# Patient Record
Sex: FEMALE | Race: WHITE | NOT HISPANIC OR LATINO | Employment: FULL TIME | URBAN - NONMETROPOLITAN AREA
[De-identification: names, ages, dates, MRNs, and addresses within clinical notes are randomized per-mention and may not be internally consistent; named-entity substitution may affect disease eponyms.]

---

## 2018-08-27 ENCOUNTER — TRANSCRIBE ORDERS (OUTPATIENT)
Dept: ADMINISTRATIVE | Facility: HOSPITAL | Age: 53
End: 2018-08-27

## 2018-08-27 DIAGNOSIS — Z01.810 PRE-OPERATIVE CARDIOVASCULAR EXAMINATION: Primary | ICD-10-CM

## 2019-05-21 ENCOUNTER — OFFICE VISIT (OUTPATIENT)
Dept: FAMILY MEDICINE CLINIC | Facility: CLINIC | Age: 54
End: 2019-05-21

## 2019-05-21 VITALS
BODY MASS INDEX: 28.04 KG/M2 | OXYGEN SATURATION: 98 % | SYSTOLIC BLOOD PRESSURE: 128 MMHG | WEIGHT: 185 LBS | DIASTOLIC BLOOD PRESSURE: 80 MMHG | HEIGHT: 68 IN
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
## 2019-05-21 DIAGNOSIS — M25.531 RIGHT WRIST PAIN: Primary | ICD-10-CM

## 2019-05-21 PROCEDURE — 99213 OFFICE O/P EST LOW 20 MIN: CPT | Performed by: FAMILY MEDICINE

## 2019-05-21 RX ORDER — MULTIVIT WITH MINERALS/LUTEIN
1000 TABLET ORAL DAILY
COMMUNITY
End: 2020-04-20

## 2019-05-28 NOTE — PROGRESS NOTES
ID: Swetha Mabry    CC:   Chief Complaint   Patient presents with   • Wrist Injury   • Fall       Subjective:     HPI     Swetha Mabry is a 54 y.o. female who presents with complaint of right wrist pain.  Patient states that she fell yesterday when she tripped at her home.  Since then she has had pain of the right wrist and hand.  She also endorses trace swelling and bruising with a minor abrasion present.  Pain is described as a dull throb which is worsened with use of the hand.  She does feel that this is improving.        Past Medical Hx:  No past medical history on file.    Past Surgical Hx:  No past surgical history on file.    Health Maintenance:  Health Maintenance   Topic Date Due   • ANNUAL PHYSICAL  01/14/1970   • TDAP/TD VACCINES (1 - Tdap) 01/14/1986   • ZOSTER VACCINE (1 of 2) 01/14/2017   • MAMMOGRAM  08/27/2018   • COLONOSCOPY  08/27/2018   • INFLUENZA VACCINE  08/01/2019   • PAP SMEAR  03/06/2021       Current Meds:    Current Outpatient Medications:   •  Cholecalciferol (VITAMIN D-3) 5000 units tablet, Take  by mouth., Disp: , Rfl:   •  vitamin E 1000 UNIT capsule, Take 1,000 Units by mouth Daily., Disp: , Rfl:     Allergies:  Meperidine and Latex    Family Hx:  No family history on file.     Social History:  Social History     Socioeconomic History   • Marital status:      Spouse name: Not on file   • Number of children: Not on file   • Years of education: Not on file   • Highest education level: Not on file       Review of Systems   Constitutional: Negative for activity change, appetite change, fatigue and fever.   HENT: Negative for ear pain and sore throat.    Eyes: Negative for pain and visual disturbance.   Respiratory: Negative for cough and shortness of breath.    Cardiovascular: Negative for chest pain and palpitations.   Gastrointestinal: Negative for abdominal pain and nausea.   Endocrine: Negative for cold intolerance and heat intolerance.   Genitourinary: Negative for  "difficulty urinating and dysuria.   Musculoskeletal: Negative for arthralgias and gait problem.   Skin: Positive for wound. Negative for color change and rash.   Neurological: Negative for dizziness, weakness and headaches.   Hematological: Negative for adenopathy. Does not bruise/bleed easily.   Psychiatric/Behavioral: Negative for agitation, confusion and sleep disturbance.           Objective:     /80   Ht 172.7 cm (68\")   Wt 83.9 kg (185 lb)   SpO2 98%   BMI 28.13 kg/m²     Physical Exam   Constitutional: She is oriented to person, place, and time. She appears well-developed and well-nourished. No distress.   HENT:   Head: Normocephalic and atraumatic.   Nose: Nose normal.   Eyes: Conjunctivae are normal. Right eye exhibits no discharge. Left eye exhibits no discharge.   Neck: Neck supple.   Cardiovascular: Normal rate, regular rhythm and normal heart sounds. Exam reveals no gallop and no friction rub.   No murmur heard.  Pulmonary/Chest: Effort normal and breath sounds normal. No accessory muscle usage. No respiratory distress. She has no wheezes. She has no rales. She exhibits no tenderness.   Abdominal: Soft. Bowel sounds are normal. She exhibits no distension. There is no tenderness.   Musculoskeletal: She exhibits no edema or deformity.        Hands:  Neurological: She is alert and oriented to person, place, and time.   Skin: Skin is warm and dry. No rash noted. She is not diaphoretic. No erythema. No pallor.   Psychiatric: She has a normal mood and affect. Her behavior is normal.       Assessment/Plan:     Swetha was seen today for wrist injury and fall.    Diagnoses and all orders for this visit:    Right wrist pain  -     XR Wrist 3+ View Right; Future    Fall, initial encounter  -     XR Wrist 3+ View Right; Future          Follow-up:     Return if symptoms worsen or fail to improve.    Patient's Body mass index is 28.13 kg/m². BMI is above normal parameters. Recommendations include: exercise " counseling and nutrition counseling.      Health Maintenance   Topic Date Due   • ANNUAL PHYSICAL  01/14/1970   • TDAP/TD VACCINES (1 - Tdap) 01/14/1986   • ZOSTER VACCINE (1 of 2) 01/14/2017   • MAMMOGRAM  08/27/2018   • COLONOSCOPY  08/27/2018   • INFLUENZA VACCINE  08/01/2019   • PAP SMEAR  03/06/2021     eat more fruits and vegetables, increase water intake, reduce screen time, cut out extra servings, family to eat at dinner table more often and have 3 meals a day    RISK SCORE: 2          This document has been electronically signed by Marika Flor MD on May 28, 2019 3:16 PM

## 2019-05-28 NOTE — PROGRESS NOTES
I have reviewed the notes, assessments, and/or procedures performed by dr Flor, I concur with her/his documentation and assessment and plan for Swetha Mabry.                This document has been electronically signed by Timmy Guzmán MD on May 28, 2019 5:37 PM

## 2019-08-19 ENCOUNTER — OFFICE VISIT (OUTPATIENT)
Dept: FAMILY MEDICINE CLINIC | Facility: CLINIC | Age: 54
End: 2019-08-19

## 2019-08-19 VITALS
WEIGHT: 180.6 LBS | TEMPERATURE: 97 F | HEART RATE: 77 BPM | BODY MASS INDEX: 27.37 KG/M2 | HEIGHT: 68 IN | SYSTOLIC BLOOD PRESSURE: 130 MMHG | DIASTOLIC BLOOD PRESSURE: 78 MMHG | OXYGEN SATURATION: 99 %

## 2019-08-19 DIAGNOSIS — H00.012 HORDEOLUM EXTERNUM OF RIGHT LOWER EYELID: Primary | ICD-10-CM

## 2019-08-19 PROCEDURE — 99213 OFFICE O/P EST LOW 20 MIN: CPT | Performed by: STUDENT IN AN ORGANIZED HEALTH CARE EDUCATION/TRAINING PROGRAM

## 2019-08-29 PROBLEM — N60.01 BREAST CYST, RIGHT: Status: ACTIVE | Noted: 2019-04-05

## 2019-08-29 PROBLEM — N93.8 DUB (DYSFUNCTIONAL UTERINE BLEEDING): Status: ACTIVE | Noted: 2018-09-07

## 2019-08-30 NOTE — PROGRESS NOTES
FAMILY MEDICINE  RESIDENCY CLINIC PROGRESS NOTE  Clifford Ramírez Jr, MD  Subjective   SUBJECTIVE:   CC: Stye (pt noticed last night. right ey)     Swetha Mabry is a 54 y.o. female who [facility] today for a Stye on her right eye.    Stye  Patient presents for evaluation of Stye in the right eye on the external aspect of the lower lid.  Associated symptoms include pain with palpation of the area & tearing. She has noticed the above symptoms for 1 day.  Onset was sudden & began last night. Patient denies visual changes, discharge, redness, or other symptoms.  She has not tried any remedies so far.       I have reviewed the patient's medical, family, and social history in detail;there are no changes to the history as noted in the electronic medical record.   Concurrent Medical History:  History reviewed. No pertinent past medical history.  No past surgical history on file.  Current Outpatient Medications on File Prior to Visit   Medication Sig   • Cholecalciferol (VITAMIN D-3) 5000 units tablet Take  by mouth.   • vitamin E 1000 UNIT capsule Take 1,000 Units by mouth Daily.     No current facility-administered medications on file prior to visit.      She is allergic to meperidine and latex.    History reviewed. No pertinent family history.     She  reports that she has never smoked. She has never used smokeless tobacco. She reports that she does not drink alcohol or use drugs.    Review of Systems General: negative for - fatigue or weight loss  PULM: no cough, shortness of breath, or wheezing  HEENT: stye on right eye lower lid  CV: no chest pain or dyspnea on exertion  MSK: Negative for gait disturbance, joint pain, joint swelling or muscular weakness  GI: no abdominal pain, change in bowel habits, or black or bloody stools  NEURO: no TIA or stroke symptoms; No confusion, dizziness, HA or seizures    Objective   OBJECTIVE:     Vitals:    08/19/19 0858   BP: 130/78   BP  "Location: Left arm   Patient Position: Sitting   Pulse: 77   Temp: 97 °F (36.1 °C)   TempSrc: Temporal   SpO2: 99%   Weight: 81.9 kg (180 lb 9.6 oz)   Height: 172.7 cm (68\")     Physical Exam GEN: Well developed, in no acute distress  HEENT: Nodule of external right lower eye lid visualized & palpated w/noted TTP  CV: Normal S1/S2, no murmurs or friction rubs  PULM: Normal effort, without wheezes or rhonchi  GI: Soft, non-tender & non-distended; +BSx4  Ext: Full ROM, without edema or deformities  Neuro: AxO x 3, normal gait  Psych: appropriate mood & affect    DATA REVIEWED:  No visits with results within 3 Month(s) from this visit.   Latest known visit with results is:   Conversion Encounter on 01/07/2015   Component Date Value Ref Range Status   • TSH 01/07/2015 0.50  0.46 - 4.68 uIU/ml Final   • WBC 01/07/2015 4.1  3.2 - 9.8 x1000/uL Final   • RBC 01/07/2015 4.68  3.77 - 5.16 scott/mm3 Final   • Hemoglobin 01/07/2015 14.1  12.0 - 15.5 gm/dl Final   • Hematocrit 01/07/2015 41.6  35.0 - 45.0 % Final   • MCV 01/07/2015 88.9  80.0 - 98.0 fl Final   • MCH 01/07/2015 30.1  26.0 - 34.0 pg Final   • MCHC 01/07/2015 33.9  31.4 - 36.0 gm/dl Final   • RDW 01/07/2015 11.9  11.5 - 14.5 % Final   • Platelets 01/07/2015 249  150 - 450 x1000/mm3 Final   • MPV 01/07/2015 10.0  8.0 - 12.0 fl Final   • Neutrophil Rel % 01/07/2015 48.6  37.0 - 80.0 % Final   • Lymphocyte Rel % 01/07/2015 30.4  10.0 - 50.0 % Final   • Monocyte Rel % 01/07/2015 13.9* 0.0 - 12.0 % Final   • Eosinophil Rel % 01/07/2015 6.6  0.0 - 7.0 % Final   • Basophil Rel % 01/07/2015 0.5  0.0 - 2.0 % Final   • Immature Granulocyte Rel % 01/07/2015 0.00  0.00 - 0.50 % Final   • Neutrophils Absolute 01/07/2015 2.00  2.00 - 8.60 x1000/uL Final   • Lymphocytes Absolute 01/07/2015 1.25  0.60 - 4.20 x1000/uL Final   • Monocytes Absolute 01/07/2015 0.57  0.00 - 0.90 x1000/uL Final   • Eosinophils Absolute 01/07/2015 0.27  0.00 - 0.70 x1000/uL Final   • Basophils Absolute " 01/07/2015 0.02  0.00 - 0.20 x1000/uL Final   • Immature Granulocytes Absolute 01/07/2015 0.000* 0.005 - 0.022 x1000/uL Final     Assessment/Plan   ASSESSMENT & PLAN:      Diagnosis Plan   1. Hordeolum externum of right lower eyelid  · Warm compresses x 4 daily  · OTC pain medication as needed  · RTC if symptoms worsen, or have not improved in 1 week       Medication Refills:  Requested Prescriptions      No prescriptions requested or ordered in this encounter       -- Preventive Medicine Topics --   Health Maintenance Topics with due status: Overdue       Topic Date Due    ANNUAL PHYSICAL 01/14/1970    TDAP/TD VACCINES 01/14/1986    ZOSTER VACCINE 01/14/2017    MAMMOGRAM 08/27/2018     Health Maintenance Topics with due status: Due On       Topic Date Due    INFLUENZA VACCINE 08/01/2019     Patient's Body mass index is 27.46 kg/m². BMI is above normal parameters. Recommendations include: educational material and nutrition counseling.    Ms. Mabry   reports that she has never smoked. She has never used smokeless tobacco.     Patient Instructions   Follow-up:   Return if symptoms worsen or fail to improve.    No future appointments.     RISK SCORE: 1   ___________________________  Clifford Ramírez Jr., M.D. PGY3  Family Practice Resident  [address] [place] [address]  Phone: [phone]  Fax: [phone]  ¯¯¯¯¯¯¯¯¯¯¯¯¯¯¯¯¯¯¯¯¯¯¯¯¯¯¯    This document has been electronically signed by  Clifford Ramírez Jr, MD on 08/19/2019 9:08 PM

## 2019-08-30 NOTE — PROGRESS NOTES
I have reviewed the notes, assessments, and/or procedures performed by Dr. Ramírez, I concur with her/his documentation and assessment and plan for Swetha Mabry.       This document has been electronically signed by Nick Crawford MD on August 30, 2019 11:26 AM

## 2019-10-02 ENCOUNTER — APPOINTMENT (OUTPATIENT)
Dept: CT IMAGING | Facility: HOSPITAL | Age: 54
End: 2019-10-02

## 2019-10-02 ENCOUNTER — APPOINTMENT (OUTPATIENT)
Dept: GENERAL RADIOLOGY | Facility: HOSPITAL | Age: 54
End: 2019-10-02

## 2019-10-02 ENCOUNTER — HOSPITAL ENCOUNTER (EMERGENCY)
Facility: HOSPITAL | Age: 54
Discharge: HOME | End: 2019-10-02
Attending: EMERGENCY MEDICINE | Admitting: EMERGENCY MEDICINE

## 2019-10-02 VITALS
HEIGHT: 68 IN | HEART RATE: 63 BPM | TEMPERATURE: 98 F | DIASTOLIC BLOOD PRESSURE: 87 MMHG | SYSTOLIC BLOOD PRESSURE: 170 MMHG | WEIGHT: 184.1 LBS | RESPIRATION RATE: 16 BRPM | OXYGEN SATURATION: 99 % | BODY MASS INDEX: 27.9 KG/M2

## 2019-10-02 DIAGNOSIS — S16.1XXA NECK STRAIN, INITIAL ENCOUNTER: ICD-10-CM

## 2019-10-02 DIAGNOSIS — M25.512 ACUTE PAIN OF LEFT SHOULDER: ICD-10-CM
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
## 2019-10-02 PROCEDURE — 73030 X-RAY EXAM OF SHOULDER: CPT

## 2019-10-02 PROCEDURE — 99283 EMERGENCY DEPT VISIT LOW MDM: CPT

## 2019-10-02 PROCEDURE — 70450 CT HEAD/BRAIN W/O DYE: CPT

## 2019-10-02 PROCEDURE — 71045 X-RAY EXAM CHEST 1 VIEW: CPT

## 2019-10-02 PROCEDURE — 72125 CT NECK SPINE W/O DYE: CPT

## 2019-10-02 RX ORDER — IBUPROFEN 600 MG/1
600 TABLET ORAL ONCE
Status: COMPLETED | OUTPATIENT
Start: 2019-10-02 | End: 2019-10-02

## 2019-10-02 RX ADMIN — IBUPROFEN 600 MG: 600 TABLET ORAL at 09:18

## 2019-10-02 NOTE — DISCHARGE INSTRUCTIONS
Please return with new or worsening symptoms.  Tylenol/Motrin for pain.  Heating pad as needed for discomfort.  Follow-up with primary care as needed.
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
## 2019-10-21 ENCOUNTER — OFFICE VISIT (OUTPATIENT)
Dept: FAMILY MEDICINE CLINIC | Facility: CLINIC | Age: 54
End: 2019-10-21

## 2019-10-21 VITALS
WEIGHT: 188 LBS | SYSTOLIC BLOOD PRESSURE: 128 MMHG | DIASTOLIC BLOOD PRESSURE: 78 MMHG | BODY MASS INDEX: 28.49 KG/M2 | OXYGEN SATURATION: 98 % | HEIGHT: 68 IN | HEART RATE: 86 BPM

## 2019-10-21 DIAGNOSIS — M54.9 OTHER ACUTE BACK PAIN: Primary | ICD-10-CM

## 2019-10-21 PROCEDURE — 99213 OFFICE O/P EST LOW 20 MIN: CPT | Performed by: STUDENT IN AN ORGANIZED HEALTH CARE EDUCATION/TRAINING PROGRAM

## 2019-10-21 RX ORDER — CYCLOBENZAPRINE HCL 5 MG
5 TABLET ORAL 3 TIMES DAILY PRN
Qty: 90 TABLET | Refills: 0 | Status: SHIPPED | OUTPATIENT
Start: 2019-10-21 | End: 2020-04-20 | Stop reason: SDUPTHER

## 2019-10-21 RX ORDER — MELOXICAM 15 MG/1
15 TABLET ORAL DAILY
Qty: 30 TABLET | Refills: 2 | Status: SHIPPED | OUTPATIENT
Start: 2019-10-21 | End: 2020-04-20
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
## 2019-10-23 NOTE — PROGRESS NOTES
I have seen the patient.  I have reviewed the notes, assessments, and/or procedures performed by Valdez Park MD, I concur with her/his documentation and assessment and plan for Swetha Mayatraci Mabry.               This document has been electronically signed by Timmy Guzmán MD on October 23, 2019 8:37 AM

## 2020-04-20 ENCOUNTER — OFFICE VISIT (OUTPATIENT)
Dept: FAMILY MEDICINE CLINIC | Facility: CLINIC | Age: 55
End: 2020-04-20

## 2020-04-20 VITALS
HEIGHT: 68 IN | SYSTOLIC BLOOD PRESSURE: 122 MMHG | WEIGHT: 186 LBS | HEART RATE: 114 BPM | DIASTOLIC BLOOD PRESSURE: 80 MMHG | BODY MASS INDEX: 28.19 KG/M2 | OXYGEN SATURATION: 98 %

## 2020-04-20 DIAGNOSIS — M54.50 CHRONIC LEFT-SIDED LOW BACK PAIN WITHOUT SCIATICA: Primary | ICD-10-CM

## 2020-04-20 DIAGNOSIS — G89.29 CHRONIC LEFT-SIDED LOW BACK PAIN WITHOUT SCIATICA: Primary | ICD-10-CM

## 2020-04-20 DIAGNOSIS — M16.12 PRIMARY OSTEOARTHRITIS OF LEFT HIP: ICD-10-CM

## 2020-04-20 PROCEDURE — 99213 OFFICE O/P EST LOW 20 MIN: CPT | Performed by: STUDENT IN AN ORGANIZED HEALTH CARE EDUCATION/TRAINING PROGRAM

## 2020-04-20 RX ORDER — CYCLOBENZAPRINE HCL 5 MG
5 TABLET ORAL 3 TIMES DAILY PRN
Qty: 90 TABLET | Refills: 0 | Status: SHIPPED | OUTPATIENT
Start: 2020-04-20 | End: 2020-11-03 | Stop reason: SDUPTHER
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
## 2020-04-20 NOTE — PROGRESS NOTES
I have reviewed the notes, assessments, and/or procedures performed by Dr. Dudley, I concur with her/his documentation and assessment and plan for Swetha Mabry.                This document has been electronically signed by Timmy Guzmán MD on April 20, 2020 16:39

## 2020-09-18 ENCOUNTER — OFFICE VISIT (OUTPATIENT)
Dept: FAMILY MEDICINE CLINIC | Facility: CLINIC | Age: 55
End: 2020-09-18

## 2020-09-18 DIAGNOSIS — M54.17 LUMBOSACRAL RADICULOPATHY AT L3: ICD-10-CM
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
## 2020-09-18 DIAGNOSIS — M54.17 LUMBOSACRAL RADICULOPATHY AT L2: Primary | ICD-10-CM
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
## 2020-09-18 PROCEDURE — 99443 PR PHYS/QHP TELEPHONE EVALUATION 21-30 MIN: CPT | Performed by: STUDENT IN AN ORGANIZED HEALTH CARE EDUCATION/TRAINING PROGRAM

## 2020-09-18 RX ORDER — LIDOCAINE 50 MG/G
1 PATCH TOPICAL EVERY 24 HOURS
Qty: 30 EACH | Refills: 2 | Status: SHIPPED | OUTPATIENT
Start: 2020-09-18 | End: 2021-02-25

## 2020-09-21 DIAGNOSIS — M54.17 LUMBOSACRAL RADICULOPATHY AT L2: ICD-10-CM

## 2020-09-21 DIAGNOSIS — M54.17 LUMBOSACRAL RADICULOPATHY AT L3: Primary | ICD-10-CM
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
## 2020-09-24 NOTE — PROGRESS NOTES
I have reviewed the notes, assessments, and/or procedures performed by *Dr Sampson**during office visit. I concur with her/his documentation and assessment and plan for Swetha Mabry.          This document has been electronically signed by Moses Trejo MD on September 24, 2020 10:05 CDT

## 2020-10-05 ENCOUNTER — APPOINTMENT (OUTPATIENT)
Dept: MRI IMAGING | Facility: HOSPITAL | Age: 55
End: 2020-10-05

## 2020-11-03 DIAGNOSIS — M54.50 CHRONIC LEFT-SIDED LOW BACK PAIN WITHOUT SCIATICA: ICD-10-CM

## 2020-11-03 DIAGNOSIS — G89.29 CHRONIC LEFT-SIDED LOW BACK PAIN WITHOUT SCIATICA: ICD-10-CM

## 2020-11-03 RX ORDER — CYCLOBENZAPRINE HCL 5 MG
5 TABLET ORAL 3 TIMES DAILY PRN
Qty: 90 TABLET | Refills: 0 | Status: SHIPPED | OUTPATIENT
Start: 2020-11-03 | End: 2021-01-29

## 2021-01-29 DIAGNOSIS — G89.29 CHRONIC LEFT-SIDED LOW BACK PAIN WITHOUT SCIATICA: ICD-10-CM

## 2021-01-29 DIAGNOSIS — M54.50 CHRONIC LEFT-SIDED LOW BACK PAIN WITHOUT SCIATICA: ICD-10-CM

## 2021-01-29 RX ORDER — CYCLOBENZAPRINE HCL 5 MG
TABLET ORAL
Qty: 90 TABLET | Refills: 0 | Status: SHIPPED | OUTPATIENT
Start: 2021-01-29 | End: 2021-02-23 | Stop reason: SDUPTHER

## 2021-02-23 ENCOUNTER — OFFICE VISIT (OUTPATIENT)
Dept: FAMILY MEDICINE CLINIC | Facility: CLINIC | Age: 56
End: 2021-02-23

## 2021-02-23 VITALS
OXYGEN SATURATION: 95 % | BODY MASS INDEX: 30.77 KG/M2 | WEIGHT: 203 LBS | HEIGHT: 68 IN | SYSTOLIC BLOOD PRESSURE: 124 MMHG | HEART RATE: 87 BPM | DIASTOLIC BLOOD PRESSURE: 84 MMHG

## 2021-02-23 DIAGNOSIS — M54.17 LUMBOSACRAL RADICULOPATHY AT L3: ICD-10-CM

## 2021-02-23 DIAGNOSIS — M54.17 LUMBOSACRAL RADICULOPATHY AT L2: ICD-10-CM
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
## 2021-02-23 DIAGNOSIS — R29.898 WEAKNESS OF LEFT LEG: ICD-10-CM

## 2021-02-23 PROCEDURE — 99213 OFFICE O/P EST LOW 20 MIN: CPT | Performed by: STUDENT IN AN ORGANIZED HEALTH CARE EDUCATION/TRAINING PROGRAM

## 2021-02-23 RX ORDER — CYCLOBENZAPRINE HCL 5 MG
5 TABLET ORAL 3 TIMES DAILY PRN
Qty: 90 TABLET | Refills: 0 | Status: SHIPPED | OUTPATIENT
Start: 2021-02-23 | End: 2022-04-12 | Stop reason: SDUPTHER
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
## 2021-02-25 DIAGNOSIS — M54.17 LUMBOSACRAL RADICULOPATHY AT L2: ICD-10-CM

## 2021-02-25 DIAGNOSIS — M54.17 LUMBOSACRAL RADICULOPATHY AT L3: Primary | ICD-10-CM

## 2021-02-25 DIAGNOSIS — M54.50 CHRONIC LEFT-SIDED LOW BACK PAIN WITHOUT SCIATICA: ICD-10-CM

## 2021-02-25 DIAGNOSIS — M54.17 LUMBOSACRAL RADICULOPATHY AT L3: ICD-10-CM

## 2021-02-25 DIAGNOSIS — G89.29 CHRONIC LEFT-SIDED LOW BACK PAIN WITHOUT SCIATICA: ICD-10-CM

## 2021-02-25 DIAGNOSIS — R29.898 WEAKNESS OF LEFT LEG: ICD-10-CM
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
## 2021-02-25 RX ORDER — LIDOCAINE 50 MG/G
PATCH TOPICAL
Qty: 30 PATCH | Refills: 2 | Status: SHIPPED | OUTPATIENT
Start: 2021-02-25 | End: 2022-04-12 | Stop reason: SDUPTHER

## 2021-02-25 NOTE — PROGRESS NOTES
I have spoken with the patient .   I have reviewed the notes, assessments, and/or procedures performed by Dr. Sanya Dudley, I concur with his  documentation and assessment and plan for Swetha Mabry.          This document has been electronically signed by Mayo Farris MD on February 25, 2021 09:52 CST

## 2021-03-08 ENCOUNTER — HOSPITAL ENCOUNTER (OUTPATIENT)
Dept: MRI IMAGING | Facility: HOSPITAL | Age: 56
Discharge: HOME | End: 2021-03-08
Admitting: FAMILY MEDICINE

## 2021-03-08 DIAGNOSIS — M54.50 CHRONIC LEFT-SIDED LOW BACK PAIN WITHOUT SCIATICA: ICD-10-CM
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
## 2021-03-08 DIAGNOSIS — G89.29 CHRONIC LEFT-SIDED LOW BACK PAIN WITHOUT SCIATICA: ICD-10-CM

## 2021-03-08 DIAGNOSIS — R29.898 WEAKNESS OF LEFT LEG: ICD-10-CM

## 2021-03-08 DIAGNOSIS — M54.17 LUMBOSACRAL RADICULOPATHY AT L2: ICD-10-CM

## 2021-03-08 DIAGNOSIS — M54.17 LUMBOSACRAL RADICULOPATHY AT L3: ICD-10-CM

## 2021-03-08 PROCEDURE — 72148 MRI LUMBAR SPINE W/O DYE: CPT

## 2021-03-12 ENCOUNTER — TELEPHONE (OUTPATIENT)
Dept: FAMILY MEDICINE CLINIC | Facility: CLINIC | Age: 56
End: 2021-03-12
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
## 2021-04-20 DIAGNOSIS — M54.17 LUMBOSACRAL RADICULOPATHY AT L2: ICD-10-CM

## 2021-04-20 DIAGNOSIS — R29.898 WEAKNESS OF LEFT LEG: ICD-10-CM

## 2021-04-20 DIAGNOSIS — M54.17 LUMBOSACRAL RADICULOPATHY AT L3: Primary | ICD-10-CM
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
## 2022-02-23 ENCOUNTER — LAB (OUTPATIENT)
Dept: LAB | Facility: HOSPITAL | Age: 57
End: 2022-02-23

## 2022-02-23 ENCOUNTER — OFFICE VISIT (OUTPATIENT)
Dept: FAMILY MEDICINE CLINIC | Facility: CLINIC | Age: 57
End: 2022-02-23

## 2022-02-23 VITALS
SYSTOLIC BLOOD PRESSURE: 124 MMHG | OXYGEN SATURATION: 99 % | HEIGHT: 68 IN | WEIGHT: 208.06 LBS | TEMPERATURE: 98 F | HEART RATE: 86 BPM | DIASTOLIC BLOOD PRESSURE: 78 MMHG | BODY MASS INDEX: 31.53 KG/M2

## 2022-02-23 DIAGNOSIS — R10.30 LOWER ABDOMINAL PAIN: ICD-10-CM

## 2022-02-23 DIAGNOSIS — R10.2 PELVIC PAIN: Primary | ICD-10-CM

## 2022-02-23 DIAGNOSIS — N30.00 ACUTE CYSTITIS WITHOUT HEMATURIA: ICD-10-CM

## 2022-02-23 LAB
BILIRUB BLD-MCNC: ABNORMAL MG/DL
CLARITY, POC: CLEAR
COLOR UR: ABNORMAL
EXPIRATION DATE: ABNORMAL
GLUCOSE UR STRIP-MCNC: NEGATIVE MG/DL
KETONES UR QL: ABNORMAL
LEUKOCYTE EST, POC: ABNORMAL
Lab: ABNORMAL
NITRITE UR-MCNC: POSITIVE MG/ML
PH UR: 6 [PH] (ref 5–8)
PROT UR STRIP-MCNC: ABNORMAL MG/DL
RBC # UR STRIP: ABNORMAL /UL
SP GR UR: 1.03 (ref 1–1.03)
UROBILINOGEN UR QL: NORMAL

## 2022-02-23 PROCEDURE — 99213 OFFICE O/P EST LOW 20 MIN: CPT | Performed by: STUDENT IN AN ORGANIZED HEALTH CARE EDUCATION/TRAINING PROGRAM

## 2022-02-23 PROCEDURE — 81001 URINALYSIS AUTO W/SCOPE: CPT | Performed by: STUDENT IN AN ORGANIZED HEALTH CARE EDUCATION/TRAINING PROGRAM

## 2022-02-23 PROCEDURE — 87086 URINE CULTURE/COLONY COUNT: CPT | Performed by: STUDENT IN AN ORGANIZED HEALTH CARE EDUCATION/TRAINING PROGRAM

## 2022-02-23 PROCEDURE — 81003 URINALYSIS AUTO W/O SCOPE: CPT | Performed by: STUDENT IN AN ORGANIZED HEALTH CARE EDUCATION/TRAINING PROGRAM

## 2022-02-23 RX ORDER — SULFAMETHOXAZOLE AND TRIMETHOPRIM 800; 160 MG/1; MG/1
1 TABLET ORAL 2 TIMES DAILY
Qty: 6 TABLET | Refills: 0 | Status: SHIPPED | OUTPATIENT
Start: 2022-02-23 | End: 2022-02-26

## 2022-02-23 NOTE — PROGRESS NOTES
I have reviewed the notes, assessments, and/or procedures performed by Liza Jaeger MD during office visit. I concur with her/his documentation and assessment and plan for Swetha Mabry.      This document has been electronically signed by Terrence Cr MD on February 23, 2022 14:47 CST

## 2022-02-23 NOTE — PROGRESS NOTES
Family Medicine Residency  Liza Jaeger MD    Subjective:     Swetha Mabry is a 57 y.o. female who presents for lower abdominal pain.    Lower abdominal pain  Noticed the pain on Sunday while drinking coffee. Pain is persistent, worse when she pees, dull in nature but can be sharp. Has been taking Pyridium for pain with no help. Wondering if she has a UTI, but hasn't had one in years.     The following portions of the patient's history were reviewed and updated as appropriate: allergies, current medications, past family history, past medical history, past social history, past surgical history and problem list.    Past Medical Hx:  History reviewed. No pertinent past medical history.    Past Surgical Hx:  Past Surgical History:   Procedure Laterality Date   • SALPINGO OOPHORECTOMY Left 2018       Current Meds:    Current Outpatient Medications:   •  Cholecalciferol (VITAMIN D-3) 5000 units tablet, Take  by mouth., Disp: , Rfl:   •  cyclobenzaprine (FLEXERIL) 5 MG tablet, Take 1 tablet by mouth 3 (Three) Times a Day As Needed for Muscle Spasms., Disp: 90 tablet, Rfl: 0  •  diclofenac (VOLTAREN) 1 % gel gel, Apply 4 g topically to the appropriate area as directed 4 (Four) Times a Day As Needed (back pain and stiffness)., Disp: 100 g, Rfl: 1  •  lidocaine (LIDODERM) 5 %, PLACE ONE PATCH ON THE SKIN AS DIRECTED BY PROVIDER EVERY DAY. REMOVE AND DISCARD PATCH WITHIN 12 HOURS OR AS DIRECTED BY M.D., Disp: 30 patch, Rfl: 2  •  sulfamethoxazole-trimethoprim (Bactrim DS) 800-160 MG per tablet, Take 1 tablet by mouth 2 (Two) Times a Day for 3 days., Disp: 6 tablet, Rfl: 0    Allergies:  Allergies   Allergen Reactions   • Meperidine GI Intolerance and Nausea And Vomiting     n/v  Other reaction(s): GI Intolerance  n/v  n/v   • Latex Rash     Other reaction(s): Rash       Family Hx:  Family History   Problem Relation Age of Onset   • Diabetes Mother    • Hypertension Mother    • Pancreatic cancer Mother 78   • No Known  "Problems Father    • Hypertension Sister    • No Known Problems Brother    • No Known Problems Brother         Social History:  Social History     Socioeconomic History   • Marital status:    Tobacco Use   • Smoking status: Never Smoker   • Smokeless tobacco: Never Used   Substance and Sexual Activity   • Alcohol use: No   • Drug use: No   • Sexual activity: Defer       Review of Systems  Review of Systems   Constitutional: Negative for chills and fever.   HENT: Negative for rhinorrhea and sore throat.    Eyes: Negative for photophobia and visual disturbance.   Respiratory: Negative for cough and shortness of breath.    Cardiovascular: Negative for chest pain and palpitations.   Gastrointestinal: Negative for abdominal pain and nausea.   Genitourinary: Positive for dysuria and pelvic pain (  Left lower). Negative for difficulty urinating, dyspareunia and hematuria.   Musculoskeletal: Negative for arthralgias and back pain.   Neurological: Negative for light-headedness and headaches.   Psychiatric/Behavioral: Negative for dysphoric mood and sleep disturbance.   All other systems reviewed and are negative.      Objective:     /78   Pulse 86   Temp 98 °F (36.7 °C)   Ht 172.7 cm (68\")   Wt 94.4 kg (208 lb 1 oz)   SpO2 99%   BMI 31.64 kg/m²   Physical Exam  Vitals reviewed.   Constitutional:       General: She is not in acute distress.     Appearance: Normal appearance. She is well-developed and well-groomed.      Interventions: Face mask in place.   HENT:      Head: Normocephalic.      Right Ear: Hearing and external ear normal.      Left Ear: Hearing and external ear normal.      Nose: Nose normal.      Mouth/Throat:      Lips: Pink.      Mouth: Mucous membranes are moist.   Eyes:      General: Lids are normal.      Conjunctiva/sclera: Conjunctivae normal.      Pupils: Pupils are equal, round, and reactive to light.   Cardiovascular:      Rate and Rhythm: Normal rate and regular rhythm.      Pulses: " Normal pulses.      Heart sounds: Normal heart sounds. No murmur heard.  No friction rub. No gallop.    Pulmonary:      Effort: Pulmonary effort is normal.      Breath sounds: Normal breath sounds and air entry. No wheezing, rhonchi or rales.   Abdominal:      General: Bowel sounds are normal.      Palpations: Abdomen is soft.      Tenderness: There is abdominal tenderness in the suprapubic area and left lower quadrant.   Musculoskeletal:      Cervical back: Neck supple.   Skin:     General: Skin is warm.   Neurological:      Mental Status: She is alert and oriented to person, place, and time.   Psychiatric:         Attention and Perception: Attention and perception normal.         Mood and Affect: Mood and affect normal.         Speech: Speech normal.         Behavior: Behavior normal. Behavior is cooperative.         Thought Content: Thought content normal.         Cognition and Memory: Cognition and memory normal.         Judgment: Judgment normal.          Assessment/Plan:     Diagnoses and all orders for this visit:    1. Pelvic pain (Primary)    2. Lower abdominal pain  -     POCT urinalysis dipstick, automated    3. Acute cystitis without hematuria  -     sulfamethoxazole-trimethoprim (Bactrim DS) 800-160 MG per tablet; Take 1 tablet by mouth 2 (Two) Times a Day for 3 days.  Dispense: 6 tablet; Refill: 0  -     Urinalysis With Culture If Indicated -; Future  -     Urinalysis With Culture If Indicated -      1. Per physical exam above.     2.  POCT urine dipstick. Will send for UA & culture.    3.  Bactrim bid for 3 days. Follow up on UA & culture and will call the patient back with the results.     · Rx changes: As above  · Patient Education: As above  · Compliance at present is estimated to be excellent.   · Efforts to improve compliance (if necessary) will be directed at Continued physician-patient relationship.    Depression screening: Up to date; last screen 2/23/2021      Follow-up:     Return if  symptoms worsen or fail to improve, for Next scheduled follow up.    FOLLOW UP: (Next issue as needed)    Preventative:  Health Maintenance   Topic Date Due   • ANNUAL PHYSICAL  Never done   • COVID-19 Vaccine (1) Never done   • TDAP/TD VACCINES (1 - Tdap) Never done   • ZOSTER VACCINE (1 of 2) Never done   • HEPATITIS C SCREENING  Never done   • MAMMOGRAM  Never done   • PAP SMEAR  08/27/2018   • INFLUENZA VACCINE  08/01/2021   • COLORECTAL CANCER SCREENING  04/08/2029   • Pneumococcal Vaccine 0-64  Aged Out     Female Preventative: Currently up to date  Recommended: none  Vaccine Counseling: N/A  Interest in Covid Vaccine: The patient qualifies to receive the vaccine, but they have not yet received it.    Weight  -Class: Obese Class I: 30-34.9kg/m2  -Patient's Body mass index is 31.64 kg/m². indicating that she is obese (BMI >30). Obesity-related health conditions include the following: none. Obesity is unchanged. BMI is is above average and patient is aware. We discussed portion control and increasing exercise..   eat more fruits and vegetables, decrease soda or juice intake, increase water intake, increase physical activity, reduce portion size, cut out extra servings, reduce fast food intake, plan meals, and have 3 meals a day    Alcohol use:  reports no history of alcohol use.  Nicotine status  reports that she has never smoked. She has never used smokeless tobacco.    Goals     • Less pain      Barriers: None            RISK SCORE: 1      This document has been electronically signed by Liza Jaeger MD on February 23, 2022 14:36 CST

## 2022-02-24 LAB
BACTERIA UR QL AUTO: ABNORMAL /HPF
BILIRUB UR QL STRIP: NEGATIVE
CLARITY UR: ABNORMAL
COLOR UR: ABNORMAL
GLUCOSE UR STRIP-MCNC: NEGATIVE MG/DL
HGB UR QL STRIP.AUTO: ABNORMAL
HYALINE CASTS UR QL AUTO: ABNORMAL /LPF
KETONES UR QL STRIP: NEGATIVE
LEUKOCYTE ESTERASE UR QL STRIP.AUTO: NEGATIVE
NITRITE UR QL STRIP: POSITIVE
PH UR STRIP.AUTO: 5.5 [PH] (ref 5–8)
PROT UR QL STRIP: ABNORMAL
RBC # UR STRIP: ABNORMAL /HPF
REF LAB TEST METHOD: ABNORMAL
SP GR UR STRIP: >=1.03 (ref 1–1.03)
SQUAMOUS #/AREA URNS HPF: ABNORMAL /HPF
UROBILINOGEN UR QL STRIP: ABNORMAL
WBC # UR STRIP: ABNORMAL /HPF

## 2022-02-25 ENCOUNTER — HOSPITAL ENCOUNTER (OUTPATIENT)
Dept: CT IMAGING | Facility: HOSPITAL | Age: 57
Discharge: HOME | End: 2022-02-25
Admitting: FAMILY MEDICINE

## 2022-02-25 DIAGNOSIS — N20.0 NEPHROLITHIASIS: Primary | ICD-10-CM

## 2022-02-25 DIAGNOSIS — N20.0 NEPHROLITHIASIS: ICD-10-CM

## 2022-02-25 LAB — BACTERIA SPEC AEROBE CULT: NO GROWTH

## 2022-02-25 PROCEDURE — 74176 CT ABD & PELVIS W/O CONTRAST: CPT

## 2022-02-25 RX ORDER — TAMSULOSIN HYDROCHLORIDE 0.4 MG/1
1 CAPSULE ORAL DAILY
Qty: 14 CAPSULE | Refills: 0 | Status: SHIPPED | OUTPATIENT
Start: 2022-02-25 | End: 2022-04-12

## 2022-02-27 DIAGNOSIS — K57.32 DIVERTICULITIS LARGE INTESTINE W/O PERFORATION OR ABSCESS W/O BLEEDING: Primary | ICD-10-CM

## 2022-02-27 RX ORDER — AMOXICILLIN AND CLAVULANATE POTASSIUM 875; 125 MG/1; MG/1
1 TABLET, FILM COATED ORAL EVERY 8 HOURS
Qty: 21 TABLET | Refills: 0 | Status: SHIPPED | OUTPATIENT
Start: 2022-02-27 | End: 2022-03-06

## 2022-02-27 NOTE — PROGRESS NOTES
Received call from radiology stating that patient has acute sigmoid diverticulitis.  Called patient this AM and informed her of the result.  Patient will be started on augmentin for 7 days and should be re-evaluated in 2-3 days after the starting course for improvement/worsening of symptoms.      This document has been electronically signed by Jaspal Smith MD on February 27, 2022 06:32 CST

## 2022-03-03 DIAGNOSIS — K57.32 DIVERTICULITIS OF SIGMOID COLON: Primary | ICD-10-CM

## 2022-03-03 RX ORDER — METRONIDAZOLE 500 MG/1
500 TABLET ORAL 2 TIMES DAILY
Qty: 10 TABLET | Refills: 0 | Status: SHIPPED | OUTPATIENT
Start: 2022-03-03 | End: 2022-03-08

## 2022-04-12 ENCOUNTER — OFFICE VISIT (OUTPATIENT)
Dept: FAMILY MEDICINE CLINIC | Facility: CLINIC | Age: 57
End: 2022-04-12

## 2022-04-12 VITALS
BODY MASS INDEX: 30.62 KG/M2 | HEART RATE: 72 BPM | WEIGHT: 202 LBS | OXYGEN SATURATION: 97 % | DIASTOLIC BLOOD PRESSURE: 68 MMHG | SYSTOLIC BLOOD PRESSURE: 110 MMHG | HEIGHT: 68 IN

## 2022-04-12 DIAGNOSIS — M54.17 LUMBOSACRAL RADICULOPATHY AT L2: ICD-10-CM

## 2022-04-12 DIAGNOSIS — M54.17 LUMBOSACRAL RADICULOPATHY AT L3: ICD-10-CM

## 2022-04-12 DIAGNOSIS — K57.92 DIVERTICULITIS: Primary | ICD-10-CM

## 2022-04-12 DIAGNOSIS — Z12.11 ENCOUNTER FOR SCREENING COLONOSCOPY: ICD-10-CM

## 2022-04-12 PROCEDURE — 99214 OFFICE O/P EST MOD 30 MIN: CPT | Performed by: FAMILY MEDICINE

## 2022-04-12 RX ORDER — LIDOCAINE 50 MG/G
1 PATCH TOPICAL EVERY 24 HOURS
Qty: 30 PATCH | Refills: 2 | Status: SHIPPED | OUTPATIENT
Start: 2022-04-12 | End: 2022-06-13 | Stop reason: SDUPTHER

## 2022-04-12 RX ORDER — CYCLOBENZAPRINE HCL 5 MG
5 TABLET ORAL 3 TIMES DAILY PRN
Qty: 90 TABLET | Refills: 2 | Status: SHIPPED | OUTPATIENT
Start: 2022-04-12
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
## 2022-04-14 ENCOUNTER — LAB (OUTPATIENT)
Dept: LAB | Facility: HOSPITAL | Age: 57
End: 2022-04-14

## 2022-04-14 DIAGNOSIS — M54.17 LUMBOSACRAL RADICULOPATHY AT L3: ICD-10-CM

## 2022-04-14 DIAGNOSIS — K57.92 DIVERTICULITIS: ICD-10-CM

## 2022-04-14 LAB
ALBUMIN SERPL-MCNC: 4.1 G/DL (ref 3.5–5.2)
ALBUMIN/GLOB SERPL: 1.2 G/DL
ALP SERPL-CCNC: 97 U/L (ref 39–117)
ALT SERPL W P-5'-P-CCNC: 23 U/L (ref 1–33)
ANION GAP SERPL CALCULATED.3IONS-SCNC: 10.9 MMOL/L (ref 5–15)
AST SERPL-CCNC: 15 U/L (ref 1–32)
BASOPHILS # BLD AUTO: 0.07 10*3/MM3 (ref 0–0.2)
BASOPHILS NFR BLD AUTO: 1.8 % (ref 0–1.5)
BILIRUB SERPL-MCNC: 0.2 MG/DL (ref 0–1.2)
BUN SERPL-MCNC: 12 MG/DL (ref 6–20)
BUN/CREAT SERPL: 12 (ref 7–25)
CALCIUM SPEC-SCNC: 9.9 MG/DL (ref 8.6–10.5)
CHLORIDE SERPL-SCNC: 106 MMOL/L (ref 98–107)
CHOLEST SERPL-MCNC: 182 MG/DL (ref 0–200)
CO2 SERPL-SCNC: 23.1 MMOL/L (ref 22–29)
CREAT SERPL-MCNC: 1 MG/DL (ref 0.57–1)
DEPRECATED RDW RBC AUTO: 43 FL (ref 37–54)
EGFRCR SERPLBLD CKD-EPI 2021: 66.7 ML/MIN/1.73
EOSINOPHIL # BLD AUTO: 0.34 10*3/MM3 (ref 0–0.4)
EOSINOPHIL NFR BLD AUTO: 8.7 % (ref 0.3–6.2)
ERYTHROCYTE [DISTWIDTH] IN BLOOD BY AUTOMATED COUNT: 13.4 % (ref 12.3–15.4)
GLOBULIN UR ELPH-MCNC: 3.3 GM/DL
GLUCOSE SERPL-MCNC: 86 MG/DL (ref 65–99)
HCT VFR BLD AUTO: 42.7 % (ref 34–46.6)
HDLC SERPL-MCNC: 70 MG/DL (ref 40–60)
HGB BLD-MCNC: 14.3 G/DL (ref 12–15.9)
IMM GRANULOCYTES # BLD AUTO: 0.01 10*3/MM3 (ref 0–0.05)
IMM GRANULOCYTES NFR BLD AUTO: 0.3 % (ref 0–0.5)
LDLC SERPL CALC-MCNC: 99 MG/DL (ref 0–100)
LDLC/HDLC SERPL: 1.4 {RATIO}
LYMPHOCYTES # BLD AUTO: 1.45 10*3/MM3 (ref 0.7–3.1)
LYMPHOCYTES NFR BLD AUTO: 37.2 % (ref 19.6–45.3)
MCH RBC QN AUTO: 29.7 PG (ref 26.6–33)
MCHC RBC AUTO-ENTMCNC: 33.5 G/DL (ref 31.5–35.7)
MCV RBC AUTO: 88.6 FL (ref 79–97)
MONOCYTES # BLD AUTO: 0.42 10*3/MM3 (ref 0.1–0.9)
MONOCYTES NFR BLD AUTO: 10.8 % (ref 5–12)
NEUTROPHILS NFR BLD AUTO: 1.61 10*3/MM3 (ref 1.7–7)
NEUTROPHILS NFR BLD AUTO: 41.2 % (ref 42.7–76)
NRBC BLD AUTO-RTO: 0 /100 WBC (ref 0–0.2)
PLATELET # BLD AUTO: 286 10*3/MM3 (ref 140–450)
PMV BLD AUTO: 10 FL (ref 6–12)
POTASSIUM SERPL-SCNC: 4.5 MMOL/L (ref 3.5–5.2)
PROT SERPL-MCNC: 7.4 G/DL (ref 6–8.5)
RBC # BLD AUTO: 4.82 10*6/MM3 (ref 3.77–5.28)
SODIUM SERPL-SCNC: 140 MMOL/L (ref 136–145)
TRIGL SERPL-MCNC: 71 MG/DL (ref 0–150)
VLDLC SERPL-MCNC: 13 MG/DL (ref 5–40)
WBC NRBC COR # BLD: 3.9 10*3/MM3 (ref 3.4–10.8)

## 2022-04-14 PROCEDURE — 80061 LIPID PANEL: CPT

## 2022-04-14 PROCEDURE — 85025 COMPLETE CBC W/AUTO DIFF WBC: CPT

## 2022-04-14 PROCEDURE — 36415 COLL VENOUS BLD VENIPUNCTURE: CPT

## 2022-04-14 PROCEDURE — 80053 COMPREHEN METABOLIC PANEL: CPT

## 2022-06-13 DIAGNOSIS — M54.17 LUMBOSACRAL RADICULOPATHY AT L3: ICD-10-CM

## 2022-06-13 DIAGNOSIS — M54.17 LUMBOSACRAL RADICULOPATHY AT L2: ICD-10-CM

## 2022-06-13 RX ORDER — LIDOCAINE 50 MG/G
1 PATCH TOPICAL EVERY 24 HOURS
Qty: 30 PATCH | Refills: 2 | Status: SHIPPED | OUTPATIENT
Start: 2022-06-13

## 2022-07-05 RX ORDER — LIDOCAINE 50 MG/G
1 OINTMENT TOPICAL
Qty: 50 G | Refills: 2 | Status: SHIPPED | OUTPATIENT
Start: 2022-07-05

## 2023-04-19 DIAGNOSIS — M54.17 LUMBOSACRAL RADICULOPATHY AT L2: ICD-10-CM

## 2023-04-19 DIAGNOSIS — M54.17 LUMBOSACRAL RADICULOPATHY AT L3: ICD-10-CM

## 2023-04-19 RX ORDER — CYCLOBENZAPRINE HCL 5 MG
TABLET ORAL
Qty: 90 TABLET | Refills: 1 | Status: SHIPPED | OUTPATIENT
Start: 2023-04-19

## 2023-09-02 DIAGNOSIS — M54.17 LUMBOSACRAL RADICULOPATHY AT L3: ICD-10-CM

## 2023-09-02 DIAGNOSIS — M54.17 LUMBOSACRAL RADICULOPATHY AT L2: ICD-10-CM

## 2023-09-05 RX ORDER — LIDOCAINE 50 MG/G
1 PATCH TOPICAL EVERY 24 HOURS
Qty: 30 EACH | Refills: 1 | Status: SHIPPED | OUTPATIENT
Start: 2023-09-05